# Patient Record
Sex: FEMALE | Race: WHITE | URBAN - NONMETROPOLITAN AREA
[De-identification: names, ages, dates, MRNs, and addresses within clinical notes are randomized per-mention and may not be internally consistent; named-entity substitution may affect disease eponyms.]

---

## 2023-05-01 ENCOUNTER — LAB OUTSIDE AN ENCOUNTER (OUTPATIENT)
Dept: URBAN - NONMETROPOLITAN AREA CLINIC 2 | Facility: CLINIC | Age: 73
End: 2023-05-01

## 2023-05-01 ENCOUNTER — DASHBOARD ENCOUNTERS (OUTPATIENT)
Age: 73
End: 2023-05-01

## 2023-05-01 ENCOUNTER — OFFICE VISIT (OUTPATIENT)
Dept: URBAN - NONMETROPOLITAN AREA CLINIC 2 | Facility: CLINIC | Age: 73
End: 2023-05-01
Payer: MEDICARE

## 2023-05-01 ENCOUNTER — WEB ENCOUNTER (OUTPATIENT)
Dept: URBAN - NONMETROPOLITAN AREA CLINIC 2 | Facility: CLINIC | Age: 73
End: 2023-05-01

## 2023-05-01 ENCOUNTER — TELEPHONE ENCOUNTER (OUTPATIENT)
Dept: URBAN - METROPOLITAN AREA CLINIC 35 | Facility: CLINIC | Age: 73
End: 2023-05-01

## 2023-05-01 VITALS
HEART RATE: 68 BPM | HEIGHT: 65 IN | SYSTOLIC BLOOD PRESSURE: 144 MMHG | BODY MASS INDEX: 25.33 KG/M2 | DIASTOLIC BLOOD PRESSURE: 75 MMHG | WEIGHT: 152 LBS

## 2023-05-01 DIAGNOSIS — R19.5 ABNORMAL STOOL COLOR: ICD-10-CM

## 2023-05-01 PROCEDURE — 99204 OFFICE O/P NEW MOD 45 MIN: CPT

## 2023-05-01 PROCEDURE — 99244 OFF/OP CNSLTJ NEW/EST MOD 40: CPT

## 2023-05-01 NOTE — HPI-TODAY'S VISIT:
5/1/2023 Mrs Green presents for evaluation of abnormal stool. Last colonoscopy with Dr. Ej Rodarte was 3 yrs ago.  Patient states the last couple of weeks she is having BMs with at times  mucous only. These are not frequent, only a few a day 2-3 bms per day, with sometimes little tiny skinny stool. She describes the mucous more like "pus"  more waxy, whitish grayish. She denies abdominal pain, just feels pressure. Over the last couple of days it stopped and her stool now is coming out now getting more normal shape and color. She has no family history of colon cancer. She denies any blood per rectum. She still has her GB. Smoking history but quit 30 yrs ago She endorses intake of a glass of wine rarely. Rare use of NSAIDs. SP

## 2023-05-02 LAB
A/G RATIO: 1.9
ABSOLUTE BASOPHILS: 61
ABSOLUTE EOSINOPHILS: 316
ABSOLUTE LYMPHOCYTES: 3131
ABSOLUTE MONOCYTES: 867
ABSOLUTE NEUTROPHILS: 5824
ALBUMIN: 4.3
ALKALINE PHOSPHATASE: 88
ALT (SGPT): 12
AST (SGOT): 20
BASOPHILS: 0.6
BILIRUBIN, TOTAL: 0.4
BUN/CREATININE RATIO: (no result)
BUN: 16
C-REACTIVE PROTEIN, QUANT: 1.6
CALCIUM: 9.7
CARBON DIOXIDE, TOTAL: 29
CHLORIDE: 104
CREATININE: 0.97
EGFR: 61
EOSINOPHILS: 3.1
GLOBULIN, TOTAL: 2.3
GLUCOSE: 73
HEMATOCRIT: 44.5
HEMOGLOBIN: 14.9
IMMUNOGLOBULIN A: 224
INTERPRETATION: (no result)
LYMPHOCYTES: 30.7
MCH: 29.7
MCHC: 33.5
MCV: 88.6
MONOCYTES: 8.5
MPV: 11.3
NEUTROPHILS: 57.1
PLATELET COUNT: 217
POTASSIUM: 4.6
PROTEIN, TOTAL: 6.6
RDW: 13.3
RED BLOOD CELL COUNT: 5.02
SED RATE BY MODIFIED: 9
SODIUM: 143
TISSUE TRANSGLUTAMINASE AB, IGA: <1
WHITE BLOOD CELL COUNT: 10.2

## 2023-05-03 ENCOUNTER — WEB ENCOUNTER (OUTPATIENT)
Dept: URBAN - METROPOLITAN AREA CLINIC 35 | Facility: CLINIC | Age: 73
End: 2023-05-03

## 2023-05-04 ENCOUNTER — TELEPHONE ENCOUNTER (OUTPATIENT)
Dept: URBAN - NONMETROPOLITAN AREA CLINIC 2 | Facility: CLINIC | Age: 73
End: 2023-05-04

## 2023-05-04 LAB
ADENOVIRUS F 40/41: NOT DETECTED
C. DIFFICILE TOXIN A/B, STOOL - QDX: POSITIVE
CALPROTECTIN, STOOL - QDX: (no result)
CAMPYLOBACTER: NOT DETECTED
CLOSTRIDIUM DIFFICILE: DETECTED
CRYPTOSPORIDIUM: NOT DETECTED
ENTAMOEBA HISTOLYTICA: NOT DETECTED
ENTEROAGGREGATIVE E.COLI: NOT DETECTED
ENTEROTOXIGENIC E.COLI: NOT DETECTED
ESCHERICHIA COLI O157: NOT DETECTED
FECAL FAT, QUALITATIVE: (no result)
GIARDIA LAMBLIA: NOT DETECTED
NOROVIRUS GI/GII: NOT DETECTED
PANCREATICELASTASE ELISA, STOOL: (no result)
ROTAVIRUS A: NOT DETECTED
SALMONELLA SPP.: NOT DETECTED
SHIGA-LIKE TOXIN PRODUCING E.COLI: NOT DETECTED
SHIGELLA SPP. / ENTEROINVASIVE E.COLI: NOT DETECTED
VIBRIO PARAHAEMOLYTICUS: NOT DETECTED
VIBRIO SPP.: NOT DETECTED
YERSINIA ENTEROCOLITICA: NOT DETECTED

## 2023-05-12 PROBLEM — 271863002: Status: ACTIVE | Noted: 2023-05-05

## 2023-05-12 PROBLEM — 271840007: Status: ACTIVE | Noted: 2023-05-05

## 2023-05-18 LAB — CLOSTRIDIUM DIFFICILE: (no result)

## 2023-05-22 ENCOUNTER — TELEPHONE ENCOUNTER (OUTPATIENT)
Dept: URBAN - NONMETROPOLITAN AREA CLINIC 2 | Facility: CLINIC | Age: 73
End: 2023-05-22

## 2023-05-22 PROBLEM — 47367009: Status: ACTIVE | Noted: 2023-05-22

## 2023-05-22 RX ORDER — PANCRELIPASE 36000; 180000; 114000 [USP'U]/1; [USP'U]/1; [USP'U]/1
AS DIRECTED CAPSULE, DELAYED RELEASE PELLETS ORAL
Qty: 360 CAPSULES | Refills: 3 | OUTPATIENT
Start: 2023-05-22